# Patient Record
Sex: MALE | URBAN - METROPOLITAN AREA
[De-identification: names, ages, dates, MRNs, and addresses within clinical notes are randomized per-mention and may not be internally consistent; named-entity substitution may affect disease eponyms.]

---

## 2021-01-29 ENCOUNTER — PATIENT OUTREACH (OUTPATIENT)
Dept: CARE COORDINATION | Facility: CLINIC | Age: 31
End: 2021-01-29

## 2021-01-29 DIAGNOSIS — Z65.9 PSYCHOSOCIAL PROBLEM: Primary | ICD-10-CM

## 2021-01-29 NOTE — PROGRESS NOTES
Clinic Care Coordination Contact  [facility]/Voic[facility]    Referral Source: PCP  Clinical Data: Care Coordinator Outreach  Outreach attempted x 1.  Left message on patient's voicemail with call back information and requested return call.  Plan: Care Coordinator will try to reach patient again in 1-2 business days.      FELICITAS Voss  Clinic Care Coordinator  [phone]  [email or web address]

## 2021-02-15 ENCOUNTER — PATIENT OUTREACH (OUTPATIENT)
Dept: CARE COORDINATION | Facility: CLINIC | Age: 31
End: 2021-02-15

## 2021-02-15 DIAGNOSIS — Z65.9 PSYCHOSOCIAL PROBLEM: Primary | ICD-10-CM

## 2021-02-15 ASSESSMENT — ACTIVITIES OF DAILY LIVING (ADL): DEPENDENT_IADLS:: INDEPENDENT

## 2021-02-15 NOTE — LETTER
[facility]  [address],  [place] [address]  February 15, 2021    Kanu Schooler  [address]    [place] [address]      Dear Kanu,    I am a clinic care coordinator who works with Teresita Hernandez MD at [facility]. I wanted to thank you for spending the time to talk with me.  Below is a description of clinic care coordination and how I can further assist you.      The clinic care coordination team is made up of a registered nurse,  and community health worker who understand the health care system. The goal of clinic care coordination is to help you manage your health and improve access to the health care system in the most efficient manner. The team can assist you in meeting your health care goals by providing education, coordinating services, strengthening the communication among your providers and supporting you with any resource needs.    Please feel free to contact me at [phone] with any questions or concerns. We are focused on providing you with the highest-quality healthcare experience possible and that all starts with you.     Sincerely,   FELICITAS Voss  Clinic Care Coordinator  [phone]  [email or web address]

## 2021-02-15 NOTE — LETTER
[facility]  Complex Care Plan  About Me:    Patient Name:  Kanu Mathis    YOB: 1990  Age:         30 year old   Anette MRN:    [id number] Telephone Information:  Home Phone [phone]       Address:  [place] [address] Email address:  No e-mail address on record      Emergency Contact(s)    Name Relationship Lgl Grd Work Phone Home Phone Mobile Phone   1. DAGOBERTO NAIR Mother   [phone]    2. DAGOBERTO NAIR Mother Yes  [phone]            Primary language:  Data Unavailable     needed? Data Unavailable   [facility] [employer]:  [phone] op. 1  Other communication barriers: None  Preferred Method of Communication:     Current living arrangement: I live in a private home with family  Mobility Status/ Medical Equipment: Independent    Health Maintenance  Health Maintenance Reviewed:      My Access Plan  Medical Emergency 911   Primary Clinic Line [facility]; [phone]   Preferred Pharmacy [id number] - [place] - [address]  AT [facility]     Behavioral Health Crisis Line The National Suicide Prevention Lifeline at 1-699.344.6859 or 911             My Care Team Members  Patient Care Team       Relationship Specialty Notifications Start End    Teresita Hernandez MD PCP - General Student in organized health care education/training program  1/29/21     Phone: [phone] Fax: [phone]         [address]  [address]    Yesenia Cartagena LSW Lead Care Coordinator   1/29/21             My Care Plans  Self Management and Treatment Plan  Goals and (Comments)  Goals        General    1. Mental Health Management (pt-stated)     Notes - Note created  2/15/2021  3:28 PM by Yesenia Cartagena LSW    Goal Statement: I will establish with a mental health team within the next 6 months  Date Goal set: 2/15/2021  Barriers: Finances  Strengths: Awareness    Date to Achieve By: 8/15/2021  Patient expressed understanding of goal: Yes  Action steps to achieve this goal:  1. I will work with the CCSW to find an insurance plan that will assist me in paying for mental health interventions  2. I will work with my provider for medication management.  3. I will call the CCSW with questions or concerns.                   My Medical and Care Information  Problem List   Patient Active Problem List   Diagnosis     Viral warts      Current Medications and Allergies:  See printed Medication Report.    Care Coordination Start Date: 2/15/2021   Frequency of Care Coordination: monthly   Form Last Updated: 02/15/2021

## 2021-02-15 NOTE — PROGRESS NOTES
Clinic Care Coordination Contact    Clinic Care Coordination Contact  OUTREACH    Referral Information:  Referral Source: PCP    Primary Diagnosis: Behavioral Health    Chief Complaint   Patient presents with     Clinic Care Coordination - Initial     CCSW Initial         Universal Utilization:   Clinic Utilization: [facility]    Clinical Concerns:  Current Medical Concerns: NA  Current Behavioral Concerns: Patient has a history of depression and anxiety with suicidal ideation. He does not have insurance right now to be able to utilize therapy. Patient has utilized therapy in the past for his hx of trauma, but currently can not afford to pay out of pocket. CCSW discussed online options for therapy utilization that are more affordable as well as sliding scale organizations, he says he makes too much for sliding scale and he makes too much for MA. He would like CCSW to continue with outreach as his situation with his job may change and he would like to continue to work with CCSW.   Education Provided to patient:Patient verbalized understanding, engaged in AIDET communication during patient encounter.  Pain  Pain (GOAL):: No  Health Maintenance Reviewed:        Medication Management:  Did not discuss    Functional Status:  Dependent IADLs:: Independent  Bed or wheelchair confined:: No  Mobility Status: Independent  Fallen 2 or more times in the past year?: No  Any fall with injury in the past year?: No    Living Situation:  Current living arrangement:: I live in a private home with family  Type of residence:: Private home - stairs    Lifestyle & Psychosocial Needs:   Diet:: Regular  Inadequate nutrition (GOAL):: No  Tube Feeding: No  Inadequate activity/exercise (GOAL):: No  Significant changes in sleep pattern (GOAL): No  Transportation means:: Regular car     Roman Catholic or spiritual beliefs that impact treatment:: No  Mental health DX:: Yes  Mental health DX how managed:: Medication  Mental  health management concern (GOAL):: Yes  Informal Support system:: Family, Friends   Socioeconomic History     Marital status: Not on file     Spouse name: Not on file     Number of children: Not on file     Years of education: 8     Highest education level: Not on file   Occupational History     Occupation: student     Comment: [employer]     Tobacco Use     Smoking status: Passive Smoke Exposure - Never Smoker   Substance and Sexual Activity     Alcohol use: No     Drug use: No     Sexual activity: Never      Resources and Interventions:  Current Resources: None, Patient did not want the number for [facility] Resources: None  Supplies used at home:: None  Equipment Currently Used at Home: none  Employment Status: employed full-time)      Advance Care Plan/Directive  Advanced Care Plans/Directives on file:: No  Advanced Care Plan/Directive Status: Not Applicable    Referrals Placed: None     Goals:   Goals        General    1. Mental Health Management (pt-stated)     Notes - Note created  2/15/2021  3:28 PM by Yesenia Cartagena LSW    Goal Statement: I will establish with a mental health team within the next 6 months  Date Goal set: 2/15/2021  Barriers: Finances  Strengths: Awareness   Date to Achieve By: 8/15/2021  Patient expressed understanding of goal: Yes  Action steps to achieve this goal:  1. I will work with the CCSW to find an insurance plan that will assist me in paying for mental health interventions  2. I will work with my provider for medication management.  3. I will call the CCSW with questions or concerns.              Patient/Caregiver understanding: Patient verbalized understanding, engaged in AIDET communication during patient encounter.  Outreach Frequency: monthly      Plan:   CCSW will call patient in 1 month to check in.   -Patient to continue seeing PCP for medication management.       AUDELIA VossW  Clinic Care  Coordinator  [phone]  [email or web address]

## 2021-03-23 ENCOUNTER — PATIENT OUTREACH (OUTPATIENT)
Dept: CARE COORDINATION | Facility: CLINIC | Age: 31
End: 2021-03-23

## 2021-03-23 DIAGNOSIS — Z65.9 PSYCHOSOCIAL PROBLEM: Primary | ICD-10-CM

## 2021-03-23 SDOH — ECONOMIC STABILITY: TRANSPORTATION INSECURITY
IN THE PAST 12 MONTHS, HAS LACK OF TRANSPORTATION KEPT YOU FROM MEETINGS, WORK, OR FROM GETTING THINGS NEEDED FOR DAILY LIVING?: NO

## 2021-03-23 SDOH — SOCIAL STABILITY: SOCIAL NETWORK: HOW OFTEN DO YOU ATTEND CHURCH OR RELIGIOUS SERVICES?: NEVER

## 2021-03-23 SDOH — SOCIAL STABILITY: SOCIAL NETWORK: HOW OFTEN DO YOU ATTENT MEETINGS OF THE CLUB OR ORGANIZATION YOU BELONG TO?: NEVER

## 2021-03-23 SDOH — HEALTH STABILITY: MENTAL HEALTH
STRESS IS WHEN SOMEONE FEELS TENSE, NERVOUS, ANXIOUS, OR CAN'T SLEEP AT NIGHT BECAUSE THEIR MIND IS TROUBLED. HOW STRESSED ARE YOU?: TO SOME EXTENT

## 2021-03-23 SDOH — HEALTH STABILITY: PHYSICAL HEALTH: ON AVERAGE, HOW MANY DAYS PER WEEK DO YOU ENGAGE IN MODERATE TO STRENUOUS EXERCISE (LIKE A BRISK WALK)?: 5 DAYS

## 2021-03-23 SDOH — ECONOMIC STABILITY: FOOD INSECURITY: WITHIN THE PAST 12 MONTHS, THE FOOD YOU BOUGHT JUST DIDN'T LAST AND YOU DIDN'T HAVE MONEY TO GET MORE.: NEVER TRUE

## 2021-03-23 SDOH — SOCIAL STABILITY: SOCIAL INSECURITY: WITHIN THE LAST YEAR, HAVE YOU BEEN AFRAID OF YOUR PARTNER OR EX-PARTNER?: NO

## 2021-03-23 SDOH — ECONOMIC STABILITY: TRANSPORTATION INSECURITY
IN THE PAST 12 MONTHS, HAS THE LACK OF TRANSPORTATION KEPT YOU FROM MEDICAL APPOINTMENTS OR FROM GETTING MEDICATIONS?: NO

## 2021-03-23 SDOH — SOCIAL STABILITY: SOCIAL INSECURITY: WITHIN THE LAST YEAR, HAVE YOU BEEN HUMILIATED OR EMOTIONALLY ABUSED IN OTHER WAYS BY YOUR PARTNER OR EX-PARTNER?: NO

## 2021-03-23 SDOH — SOCIAL STABILITY: SOCIAL INSECURITY
WITHIN THE LAST YEAR, HAVE YOU BEEN KICKED, HIT, SLAPPED, OR OTHERWISE PHYSICALLY HURT BY YOUR PARTNER OR EX-PARTNER?: NO

## 2021-03-23 SDOH — SOCIAL STABILITY: SOCIAL NETWORK: IN A TYPICAL WEEK, HOW MANY TIMES DO YOU TALK ON THE PHONE WITH FAMILY, FRIENDS, OR NEIGHBORS?: NEVER

## 2021-03-23 SDOH — SOCIAL STABILITY: SOCIAL NETWORK
DO YOU BELONG TO ANY CLUBS OR ORGANIZATIONS SUCH AS CHURCH GROUPS UNIONS, FRATERNAL OR ATHLETIC GROUPS, OR SCHOOL GROUPS?: NO

## 2021-03-23 SDOH — SOCIAL STABILITY: SOCIAL NETWORK: HOW OFTEN DO YOU GET TOGETHER WITH FRIENDS OR RELATIVES?: NEVER

## 2021-03-23 SDOH — ECONOMIC STABILITY: FOOD INSECURITY: WITHIN THE PAST 12 MONTHS, YOU WORRIED THAT YOUR FOOD WOULD RUN OUT BEFORE YOU GOT MONEY TO BUY MORE.: NEVER TRUE

## 2021-03-23 SDOH — SOCIAL STABILITY: SOCIAL INSECURITY
WITHIN THE LAST YEAR, HAVE TO BEEN RAPED OR FORCED TO HAVE ANY KIND OF SEXUAL ACTIVITY BY YOUR PARTNER OR EX-PARTNER?: NO

## 2021-03-23 SDOH — HEALTH STABILITY: PHYSICAL HEALTH: ON AVERAGE, HOW MANY MINUTES DO YOU ENGAGE IN EXERCISE AT THIS LEVEL?: 30 MIN

## 2021-03-23 SDOH — ECONOMIC STABILITY: INCOME INSECURITY: HOW HARD IS IT FOR YOU TO PAY FOR THE VERY BASICS LIKE FOOD, HOUSING, MEDICAL CARE, AND HEATING?: NOT HARD AT ALL

## 2021-03-23 NOTE — PROGRESS NOTES
Clinic Care Coordination Contact    ROMMEL PADILLA Follow Up Progress Note      Assessment: ROMMEL PADILLA called patient to check in since last outreach. He is doing well, his anxiety has decreased. He has been more motivated to do his daily tasks. He's quit his job and will be looking for a new job.      Intervention/Education provided during outreach: ROMMEL PADILLA gave patient the information for [facility] incase he needs health insurance as he won't be working. He took down the number and will call if needed. No other needs at this time.      Outreach Frequency: 2 months    Plan:   Care Coordinator will follow up in 2 months.     Goals addressed this encounter:   Goals Addressed                 This Visit's Progress       Patient Stated      1. Mental Health Management (pt-stated)   20%     Goal Statement: I will establish with a mental health team within the next 6 months  Date Goal set: 2/15/2021  Barriers: Finances  Strengths: Awareness   Date to Achieve By: 8/15/2021  Patient expressed understanding of goal: Yes  Action steps to achieve this goal:  1. I will work with the CCSW to find an insurance plan that will assist me in paying for mental health interventions  2. I will work with my provider for medication management.  3. I will call the CCSW with questions or concerns.      3/23/2021- Patient has quit his job and will be in between jobs, Patient to establish with therapy once he gets insurance.           FELICITAS Voss  Clinic Care Coordinator  [phone]  [email or web address]

## 2021-06-03 ENCOUNTER — PATIENT OUTREACH (OUTPATIENT)
Dept: CARE COORDINATION | Facility: CLINIC | Age: 31
End: 2021-06-03

## 2021-06-03 DIAGNOSIS — Z65.9 PSYCHOSOCIAL PROBLEM: Primary | ICD-10-CM

## 2021-06-03 ASSESSMENT — ACTIVITIES OF DAILY LIVING (ADL): DEPENDENT_IADLS:: INDEPENDENT

## 2021-06-03 NOTE — PROGRESS NOTES
Clinic Care Coordination Contact  [facility]/Voicemail    Referral Source: PCP  Clinical Data: Care Coordinator Outreach  Outreach attempted x 1.  Left message on patient's voicemail with call back information and requested return call.  Plan: Care Coordinator will try to reach patient again in 1 month.      FELICITAS Voss  Clinic Care Coordinator  [phone]  [email or web address]

## 2021-07-08 ENCOUNTER — PATIENT OUTREACH (OUTPATIENT)
Dept: CARE COORDINATION | Facility: CLINIC | Age: 31
End: 2021-07-08

## 2021-07-08 NOTE — PROGRESS NOTES
Clinic Care Coordination Contact  [facility]/Voicemail       Clinical Data: Care Coordinator Outreach  Outreach attempted x 2.  Left message on patient's voicemail with call back information and requested return call.  Plan:  Care Coordinator will try to reach patient again in 1 month.  Patricia Parmar [facility]  Clinic Care Cordinator  [facility]   [phone]

## 2021-08-13 ENCOUNTER — PATIENT OUTREACH (OUTPATIENT)
Dept: CARE COORDINATION | Facility: CLINIC | Age: 31
End: 2021-08-13

## 2021-08-13 NOTE — PROGRESS NOTES
Clinic Care Coordination Contact  [facility]/Voicemail       Clinical Data: Care Coordinator Outreach  Outreach attempted x 3.  Left message on patient's voicemail with call back information and requested return call.  Plan:  Care Coordinator will try to reach patient again in 2 months.    Patricia Parmar [facility]  Clinic Care Cordinator  [facility]   [phone]

## 2021-10-27 ENCOUNTER — PATIENT OUTREACH (OUTPATIENT)
Dept: CARE COORDINATION | Facility: CLINIC | Age: 31
End: 2021-10-27

## 2021-10-27 NOTE — PROGRESS NOTES
Clinic Care Coordination Contact  [facility]/Voicemail       Clinical Data: Care Coordinator Outreach  Outreach attempted x 4.  Left message on patient's voicemail with call back information and requested return call.  Plan: Care Coordinator will send unable to contact letter with care coordinator contact information via mail. Care Coordinator will try to reach patient again in 2 months.    Patricia Parmar MARCO ANTONIO  Clinic Care Coordinator  [facility]   [phone]

## 2022-01-19 ENCOUNTER — PATIENT OUTREACH (OUTPATIENT)
Dept: CARE COORDINATION | Facility: CLINIC | Age: 32
End: 2022-01-19

## 2022-01-19 NOTE — PROGRESS NOTES
Clinic Care Coordination Contact  [facility]/Voicemail       UPDATED:Clinical Data: Care Coordinator Outreach  Outreach attempted x 4.  Left message on patient's voicemail with call back information and requested return call.  Plan:  Care Coordinator will do no further outreaches at this time.    TATI Ramirez  Social Work Care Coordinator - [facility]  Care Coordination  [email or web address]  Cell Phone: [phone]  Gender pronouns: she/her  Employed by [employer]